# Patient Record
Sex: MALE | Race: WHITE | NOT HISPANIC OR LATINO | URBAN - METROPOLITAN AREA
[De-identification: names, ages, dates, MRNs, and addresses within clinical notes are randomized per-mention and may not be internally consistent; named-entity substitution may affect disease eponyms.]

---

## 2019-10-31 NOTE — PROCEDURE NOTE: CLINICAL
PROCEDURE NOTE: Perm Epilation Left Lower Lid. Diagnosis: Trichiasis without Entropion. Prior to treatment, the risks/benefits/alternatives were discussed. The patient wished to proceed with procedure. The hyfrecator was used to remove each individual misdirected lash. Patient tolerated procedure well. There were no complications. Post procedure instructions given. Chucho Murphy

## 2021-02-24 ENCOUNTER — IMPORTED ENCOUNTER (OUTPATIENT)
Dept: URBAN - METROPOLITAN AREA CLINIC 50 | Facility: CLINIC | Age: 66
End: 2021-02-24

## 2021-03-11 ENCOUNTER — IMPORTED ENCOUNTER (OUTPATIENT)
Dept: URBAN - METROPOLITAN AREA CLINIC 50 | Facility: CLINIC | Age: 66
End: 2021-03-11

## 2021-04-17 ASSESSMENT — TONOMETRY
OD_IOP_MMHG: 16
OS_IOP_MMHG: 17

## 2021-04-17 ASSESSMENT — VISUAL ACUITY
OD_CC: J1
OD_CC: 20/25+
OS_CC: J1
OS_CC: 20/25-1

## 2022-03-14 ENCOUNTER — PREPPED CHART (OUTPATIENT)
Dept: URBAN - METROPOLITAN AREA CLINIC 49 | Facility: CLINIC | Age: 67
End: 2022-03-14

## 2022-03-17 ENCOUNTER — COMPREHENSIVE EXAM (OUTPATIENT)
Dept: URBAN - METROPOLITAN AREA CLINIC 49 | Facility: CLINIC | Age: 67
End: 2022-03-17

## 2022-03-17 DIAGNOSIS — Z01.01: ICD-10-CM

## 2022-03-17 DIAGNOSIS — H25.13: ICD-10-CM

## 2022-03-17 DIAGNOSIS — D31.32: ICD-10-CM

## 2022-03-17 DIAGNOSIS — H43.813: ICD-10-CM

## 2022-03-17 PROCEDURE — 92014 COMPRE OPH EXAM EST PT 1/>: CPT

## 2022-03-17 PROCEDURE — 92015 DETERMINE REFRACTIVE STATE: CPT

## 2022-03-17 ASSESSMENT — VISUAL ACUITY
OD_GLARE: 20/40
OS_SC: 20/200
OD_GLARE: 20/25
OD_SC: 20/200
OS_GLARE: 20/30
OS_GLARE: 20/20
OS_CC: J2@17"
OS_SC: J16@17"
OD_CC: J5@17"
OS_CC: 20/30+2
OD_CC: 20/25
OD_SC: 20/400@17"

## 2022-03-17 ASSESSMENT — TONOMETRY
OS_IOP_MMHG: 17
OD_IOP_MMHG: 17

## 2025-04-22 ENCOUNTER — COMPREHENSIVE EXAM (OUTPATIENT)
Age: 70
End: 2025-04-22

## 2025-04-22 DIAGNOSIS — D31.32: ICD-10-CM

## 2025-04-22 DIAGNOSIS — H25.13: ICD-10-CM

## 2025-04-22 DIAGNOSIS — H43.813: ICD-10-CM

## 2025-04-22 DIAGNOSIS — Z01.01: ICD-10-CM

## 2025-04-22 PROCEDURE — 92014 COMPRE OPH EXAM EST PT 1/>: CPT

## 2025-04-22 PROCEDURE — 92015 DETERMINE REFRACTIVE STATE: CPT
